# Patient Record
Sex: MALE | Race: OTHER | NOT HISPANIC OR LATINO | URBAN - METROPOLITAN AREA
[De-identification: names, ages, dates, MRNs, and addresses within clinical notes are randomized per-mention and may not be internally consistent; named-entity substitution may affect disease eponyms.]

---

## 2022-04-24 ENCOUNTER — HOSPITAL ENCOUNTER (EMERGENCY)
Facility: HOSPITAL | Age: 37
Discharge: HOME | End: 2022-04-24
Attending: EMERGENCY MEDICINE

## 2022-04-24 VITALS
RESPIRATION RATE: 18 BRPM | OXYGEN SATURATION: 98 % | BODY MASS INDEX: 31.13 KG/M2 | TEMPERATURE: 99 F | WEIGHT: 198.38 LBS | SYSTOLIC BLOOD PRESSURE: 141 MMHG | DIASTOLIC BLOOD PRESSURE: 85 MMHG | HEART RATE: 97 BPM | HEIGHT: 67 IN

## 2022-04-24 DIAGNOSIS — S92.311A CLOSED DISPLACED FRACTURE OF FIRST METATARSAL BONE OF RIGHT FOOT, INITIAL ENCOUNTER: Primary | ICD-10-CM

## 2022-04-24 DIAGNOSIS — S99.929A FOOT INJURY: ICD-10-CM

## 2022-04-24 PROCEDURE — 99284 EMERGENCY DEPT VISIT MOD MDM: CPT | Mod: 25,ER

## 2022-04-24 PROCEDURE — 63600175 PHARM REV CODE 636 W HCPCS: Mod: ER | Performed by: EMERGENCY MEDICINE

## 2022-04-24 PROCEDURE — 96372 THER/PROPH/DIAG INJ SC/IM: CPT | Performed by: EMERGENCY MEDICINE

## 2022-04-24 RX ORDER — HYDROCODONE BITARTRATE AND ACETAMINOPHEN 5; 325 MG/1; MG/1
1 TABLET ORAL EVERY 6 HOURS PRN
Qty: 12 TABLET | Refills: 0 | OUTPATIENT
Start: 2022-04-24 | End: 2023-08-09

## 2022-04-24 RX ORDER — KETOROLAC TROMETHAMINE 30 MG/ML
30 INJECTION, SOLUTION INTRAMUSCULAR; INTRAVENOUS
Status: COMPLETED | OUTPATIENT
Start: 2022-04-24 | End: 2022-04-24

## 2022-04-24 RX ORDER — IBUPROFEN 400 MG/1
400 TABLET ORAL EVERY 6 HOURS PRN
Qty: 20 TABLET | Refills: 0 | OUTPATIENT
Start: 2022-04-24 | End: 2023-08-09

## 2022-04-24 RX ADMIN — KETOROLAC TROMETHAMINE 30 MG: 30 INJECTION, SOLUTION INTRAMUSCULAR at 09:04

## 2022-04-24 NOTE — Clinical Note
"Sang Smith" Dyer was seen and treated in our emergency department on 4/24/2022.  He may return to work after being cleared by follow-up physician .       If you have any questions or concerns, please don't hesitate to call.      Nicko BERMEO"

## 2022-04-25 NOTE — ED PROVIDER NOTES
Encounter Date: 4/24/2022    SCRIBE #1 NOTE: I, Marcus Zarco, am scribing for, and in the presence of,  Sourav Teixeira MD. I have scribed the following portions of the note - Other sections scribed: HPI, ROS, PE.       History     Chief Complaint   Patient presents with    Foot Injury     Right mid foot pain after kicking a truck on Saturday.  Hurts to touch or bear weight. Swelling noted.      Sang Dyer 37 y.o. male, with no known pertinent PMHx, presents to the ED with right mid foot pain and swelling that started yesterday. Patient states he kicked a truck yesterday and that his right foot pain is exacerbated with weightbearing. Patient denies any other associated symptoms.     The history is provided by the patient and a relative. No  was used.     Review of patient's allergies indicates:  No Known Allergies  No past medical history on file.  No past surgical history on file.  No family history on file.     Review of Systems   Constitutional: Negative.    HENT: Negative.    Eyes: Negative.    Respiratory: Negative.    Cardiovascular: Negative.    Gastrointestinal: Negative.    Endocrine: Negative.    Genitourinary: Negative.    Musculoskeletal: Positive for arthralgias and joint swelling.   Skin: Negative.    Allergic/Immunologic: Negative.    Neurological: Negative.    Hematological: Negative.    Psychiatric/Behavioral: Negative.        Physical Exam     Initial Vitals [04/24/22 1935]   BP Pulse Resp Temp SpO2   (!) 146/83 101 18 99.8 °F (37.7 °C) 95 %      MAP       --         Physical Exam    Nursing note and vitals reviewed.  Constitutional: He appears well-developed and well-nourished. He is not diaphoretic. No distress.   HENT:   Head: Normocephalic and atraumatic.   Eyes: Pupils are equal, round, and reactive to light. Right eye exhibits no discharge. Left eye exhibits no discharge.   Neck: No tracheal deviation present.   Normal range of motion.  Cardiovascular: Normal rate  and regular rhythm.   Pulmonary/Chest: No stridor. No respiratory distress.   Musculoskeletal:         General: Tenderness (Noted tenderness and edema to right medial portion of midfoot extending just distally to his right ankle, 2+ DP pulses noted, motor and sensation intact to light touch throughout right foot, no abrasion or laceration noted.) and edema present.      Cervical back: Normal range of motion.     Neurological: He is alert and oriented to person, place, and time.   Skin: Skin is warm and dry.         ED Course   Procedures  Labs Reviewed - No data to display       Imaging Results          X-Ray Foot Complete Right (Final result)  Result time 04/24/22 20:32:37    Final result by Antonio Bagley MD (04/24/22 20:32:37)                 Impression:      Acute 1st metatarsal fracture.      Electronically signed by: Antonio Bagley MD  Date:    04/24/2022  Time:    20:32             Narrative:    EXAMINATION:  XR FOOT COMPLETE 3 VIEW RIGHT    CLINICAL HISTORY:  . Unspecified injury of unspecified foot, initial encounter    TECHNIQUE:  AP, lateral, and oblique views of the right foot were performed.    COMPARISON:  None    FINDINGS:  Acute displaced fracture is seen at the base of the 1st metatarsal.  There is intra-articular extension to the tarsometatarsal joint.  No additional acute displaced fractures or dislocation seen.  Lisfranc articulation appears congruent.  No radiopaque retained foreign body seen.                                 Medications   ketorolac injection 30 mg (30 mg Intramuscular Given 4/24/22 2100)     Medical Decision Making:   Independently Interpreted Test(s):   I have ordered and independently interpreted X-rays - see prior notes.  Clinical Tests:   Radiological Study: Ordered and Reviewed    MDM:    37-year-old male with past medical history as noted above presenting after right foot injury.  Physical exam as noted above, ED workup notable for acute 1st metatarsal fracture with  some intra-articular extension to the tarsometatarsal joint.  Patient placed in a postoperative shoe, Toradol given for further symptomatic care, crutches given, patient advised on nonweightbearing status and need for follow-up with Orthopedics/Podiatry for further treatment and evaluation.  Compartments appearing soft, patient advised on further rice therapies at bedside including ongoing pain management.  Discussed diagnosis and further treatment with patient and family at bedside, including f/u.  Return precautions given and all questions answered.  Patient and family in understanding of plan.  Pt discharged to home improved and stable.          Scribe Attestation:   Scribe #1: I performed the above scribed service and the documentation accurately describes the services I performed. I attest to the accuracy of the note.               I, Sourav Teixeira M.D., personally performed the services described in the documentation. All medical record entries made by the scribe were at my direction and in my presence. I have reviewed the chart and agree that the record reflects my personal performance and is accurate and complete.    Clinical Impression:   Final diagnoses:  [S99.929A] Foot injury  [S92.311A] Closed displaced fracture of first metatarsal bone of right foot, initial encounter (Primary)          ED Disposition Condition    Discharge Stable        ED Prescriptions     Medication Sig Dispense Start Date End Date Auth. Provider    HYDROcodone-acetaminophen (NORCO) 5-325 mg per tablet Take 1 tablet by mouth every 6 (six) hours as needed for Pain. 12 tablet 4/24/2022  Sourav Teixeira MD    ibuprofen (ADVIL,MOTRIN) 400 MG tablet Take 1 tablet (400 mg total) by mouth every 6 (six) hours as needed for Other. 20 tablet 4/24/2022  Sourav Teixeira MD        Follow-up Information     Follow up With Specialties Details Why Contact Info    [facility] ED Emergency Medicine Go to  If symptoms worsen  [address] [facility] [address] [phone]    [facility]  PODIATRY Podiatry   [address] [facility] [address]  [phone]    [facility]  ORTHOPEDICS Orthopedics   [address] [facility] [address]  [phone]    Jennifer Kong MD Orthopedic Surgery   [address] [facility] [address]  [phone]             Sourav Teixeira MD  04/25/22 0148

## 2022-04-25 NOTE — DISCHARGE INSTRUCTIONS
[facility] - Orthopedics Clinic:  If you would like to follow up with the [facility] Orthopedic Clinic for further care of your injury, please call the [facility] Scheduling Department at [phone] during business hours. Please let the  know you need a follow-up appointment for your injury with Orthopedics, and you will be scheduled in the Orthopedic Clinic. Please bring your Discharge Papers with you to the clinic appointment.

## 2023-08-09 ENCOUNTER — HOSPITAL ENCOUNTER (EMERGENCY)
Facility: HOSPITAL | Age: 38
Discharge: HOME | End: 2023-08-09
Attending: EMERGENCY MEDICINE

## 2023-08-09 VITALS
TEMPERATURE: 98 F | DIASTOLIC BLOOD PRESSURE: 95 MMHG | BODY MASS INDEX: 31.9 KG/M2 | WEIGHT: 203.69 LBS | OXYGEN SATURATION: 97 % | SYSTOLIC BLOOD PRESSURE: 155 MMHG | HEART RATE: 109 BPM | RESPIRATION RATE: 18 BRPM

## 2023-08-09 DIAGNOSIS — M54.31 RIGHT SIDED SCIATICA: Primary | ICD-10-CM

## 2023-08-09 DIAGNOSIS — M47.816 LUMBAR FACET ARTHROPATHY: ICD-10-CM

## 2023-08-09 PROCEDURE — 99284 EMERGENCY DEPT VISIT MOD MDM: CPT | Mod: ER

## 2023-08-09 PROCEDURE — 63600175 PHARM REV CODE 636 W HCPCS: Mod: ER | Performed by: NURSE PRACTITIONER

## 2023-08-09 PROCEDURE — 96372 THER/PROPH/DIAG INJ SC/IM: CPT | Performed by: NURSE PRACTITIONER

## 2023-08-09 PROCEDURE — 25000003 PHARM REV CODE 250: Mod: ER | Performed by: NURSE PRACTITIONER

## 2023-08-09 RX ORDER — ORPHENADRINE CITRATE 100 MG/1
100 TABLET, EXTENDED RELEASE ORAL 2 TIMES DAILY PRN
Qty: 20 TABLET | Refills: 0 | Status: SHIPPED | OUTPATIENT
Start: 2023-08-09 | End: 2023-08-19

## 2023-08-09 RX ORDER — DEXAMETHASONE SODIUM PHOSPHATE 4 MG/ML
8 INJECTION, SOLUTION INTRA-ARTICULAR; INTRALESIONAL; INTRAMUSCULAR; INTRAVENOUS; SOFT TISSUE
Status: COMPLETED | OUTPATIENT
Start: 2023-08-09 | End: 2023-08-09

## 2023-08-09 RX ORDER — IBUPROFEN 600 MG/1
600 TABLET ORAL EVERY 6 HOURS PRN
Qty: 20 TABLET | Refills: 0 | Status: SHIPPED | OUTPATIENT
Start: 2023-08-09

## 2023-08-09 RX ORDER — LIDOCAINE 50 MG/G
1 PATCH TOPICAL DAILY
Qty: 15 PATCH | Refills: 0 | Status: SHIPPED | OUTPATIENT
Start: 2023-08-09

## 2023-08-09 RX ORDER — ORPHENADRINE CITRATE 100 MG/1
100 TABLET, EXTENDED RELEASE ORAL
Status: COMPLETED | OUTPATIENT
Start: 2023-08-09 | End: 2023-08-09

## 2023-08-09 RX ADMIN — ORPHENADRINE CITRATE 100 MG: 100 TABLET, EXTENDED RELEASE ORAL at 03:08

## 2023-08-09 RX ADMIN — DEXAMETHASONE SODIUM PHOSPHATE 8 MG: 4 INJECTION INTRA-ARTICULAR; INTRALESIONAL; INTRAMUSCULAR; INTRAVENOUS; SOFT TISSUE at 03:08

## 2023-08-09 NOTE — Clinical Note
"Sang Stewartjuliana Dyer was seen and treated in our emergency department on 8/9/2023.  He may return to work on 08/14/2023.       If you have any questions or concerns, please don't hesitate to call.      Marylin BERMEO    "

## 2023-08-09 NOTE — DISCHARGE INSTRUCTIONS
Le brewer recetado NORFLEX para el dolor. No tome elis medicamento mientras trabaja, sebastián alcohol, nada o mientras conduce u opera maquinaria pesada. Elis medicamento puede causar somnolencia, afectar el juicio y reducir las capacidades físicas.    Le brewer recetado ibuprofeno para el dolor. Elis es un medicamento antiinflamatorio no esteroideo (PETER). No tome ningún PETER adicional mientras esté tomando elis medicamento, incluidos (Advil, Aleve, Motrin, Ibuprofen, Mobic\meloxicam, Naprosyn, etc.). Deje de miranda elis medicamento si experimenta: debilidad, picazón, piel u ojos amarillos, nina en las articulaciones, vómitos con luis manuel, luis manuel o heces negras, aumento de peso inusual o hinchazón en los brazos, las piernas, las wiley o los pies.    Jason por venir a nuestro Departamento de Emergencias hoy. Es importante recordar que algunos problemas o condiciones médicas son difíciles de diagnosticar y es posible que no se encuentren katie lozoya visita al Departamento de Emergencias.    Asegúrese de hacer un seguimiento con lozoya médico de atención primaria y revisar con ellos todos los laboratorios/imágenes/pruebas que se realizaron katie lozoya visita a la dixon de emergencias. Algunos laboratorios/pruebas pueden estar fuera del rango normal y requieren un seguimiento que no sea de emergencia y isela mayor investigación para ayudar a diagnosticar/excluir/prevenir complicaciones u otras afecciones médicas potencialmente graves que no se abordaron katie lozoya visita a la dixon de emergencias.    Si no tiene un médico de atención primaria, puede comunicarse con el que figura en lozoya documentación de kareem o también puede llamar al mostrador de citas de la [facility] al [phone] para programar isela seymour y establecer atención con jeremy. Es importante para lozoya jose que tenga un médico de atención primaria.    Martell todos los medicamentos según las indicaciones. Todos los medicamentos pueden tener efectos secundarios y es imposible  predecir qué medicamentos pueden causarle efectos secundarios o qué efectos secundarios (si los hay) le darán. Si siente que está teniendo un efecto negativo o secundario, efecto de cualquier medicamento, debe dejar de tomarlos inmediatamente y buscar atención médica. Si siente que está teniendo isela reacción potencialmente mortal, llame al 911.    Regrese a la dixon de emergencias si tiene preguntas/inquietudes, síntomas nuevos/preocupantes, empeoramiento o falta de mejora.    No conduzca, nade, suba a [place], se bañe, opere maquinaria pesada, mark alcohol o tome medicamentos potencialmente sedantes, firme ningún documento legal o tome decisiones importantes katie 24 horas si ha recibido algún medicamento para el dolor, sedantes o alteradores del estado de ánimo. medicamentos katie lozoya visita a la dixon de emergencias o dentro de las 24 horas de haberlos tomado si se los brewer recetado.    Puede encontrar recursos adicionales para dentistas, audífonos, equipos médicos duraderos, farmacias de bajo costo y otros recursos en [email or web address]

## 2023-08-09 NOTE — ED PROVIDER NOTES
Encounter Date: 8/9/2023    SCRIBE #1 NOTE: I, Madelien Hemphill, am scribing for, and in the presence of,  Jade Nelson NP. I have scribed the following portions of the note - Other sections scribed: HPI; ROS.       History     Chief Complaint   Patient presents with    Back Pain     Pt presents to the ED with R sided back pain , onset approx 1 month ago and worsening.     Sang Dyer is a 38 y.o. male with no known medical Hx who presents to the ED for chief complaint of right lower back pain onset 1 month ago. Patient reports the pain began after he fell, but states he landed on his left side not his right. He states the pain radiates down the right leg. Patient notes at times he can't walk because of the pain. He states his leg gives out and he gets muscle cramps and tingling in his right leg. Patient did not attempt treatment at home. He denies associated incontinence. No further complaints at this time. Patient does not have any medical allergies. Patient occasionally uses EtOH and cocaine.         The history is provided by the patient. A  was used ([email or web address]).     Review of patient's allergies indicates:  No Known Allergies  No past medical history on file.  No past surgical history on file.  No family history on file.     Review of Systems   Constitutional:  Negative for activity change, appetite change, chills and fever.   HENT:  Negative for congestion, rhinorrhea, sneezing and sore throat.    Respiratory:  Negative for cough, chest tightness, shortness of breath and wheezing.    Cardiovascular:  Negative for chest pain and palpitations.   Gastrointestinal:  Negative for abdominal pain, diarrhea, nausea and vomiting.        Negative for bowel incontinence.    Genitourinary:         Negative for bladder incontinence.    Musculoskeletal:  Positive for back pain.   Skin:  Negative for rash.   Neurological:  Negative for dizziness, light-headedness and headaches.        Positive for  tingling.    All other systems reviewed and are negative.      Physical Exam     Initial Vitals [08/09/23 1418]   BP Pulse Resp Temp SpO2   (!) 155/95 109 18 98.4 °F (36.9 °C) 97 %      MAP       --         Physical Exam    Vitals reviewed.  Constitutional: He appears well-developed and well-nourished. He is not diaphoretic.  Non-toxic appearance. He does not have a sickly appearance. He does not appear ill. No distress.   HENT:   Head: Normocephalic and atraumatic.   Right Ear: External ear normal.   Left Ear: External ear normal.   Neck:   Normal range of motion.  Cardiovascular:  Normal rate, regular rhythm, normal heart sounds and intact distal pulses.           Pulmonary/Chest: No respiratory distress.   Musculoskeletal:      Cervical back: Normal and normal range of motion.      Thoracic back: Normal.      Lumbar back: Tenderness present. Positive right straight leg raise test.      Comments: Tenderness with palpation of the right paraspinal lumbar musculature.  No midline tenderness.  5/5 strength of the bilateral upper and lower extremities with sensation intact.  Positive straight leg raise on the right.  Ambulatory with normal gait.     Neurological: He is alert and oriented to person, place, and time. GCS eye subscore is 4. GCS verbal subscore is 5. GCS motor subscore is 6.   Skin: Skin is warm, dry and intact. No rash noted. No erythema.   Psychiatric: He has a normal mood and affect. Thought content normal.         ED Course   Procedures  Labs Reviewed - No data to display       Imaging Results              X-Ray Lumbar Spine Ap And Lateral (Final result)  Result time 08/09/23 15:21:36      Final result by Zackary Soto MD (08/09/23 15:21:36)                   Impression:      1. No acute displaced fracture or dislocation of the lumbar spine.      Electronically signed by: aZckary Soto MD  Date:    08/09/2023  Time:    15:21               Narrative:    EXAMINATION:  XR LUMBAR SPINE AP AND  LATERAL    CLINICAL HISTORY:  low back pain;    TECHNIQUE:  AP, lateral and spot images were performed of the lumbar spine.    COMPARISON:  None    FINDINGS:  Three views lumbar spine.    Lateral imaging demonstrates adequate alignment of the lumbar spine without significant vertebral body height loss.  There is disc space height loss involving L5-S1.  There is lower lumbar facet arthropathy.  The sacral segments are aligned.  AP spinal alignment is remarkable for mild levo scoliotic curvature.  The bilateral sacroiliac joints are intact.                                       Medications   dexAMETHasone injection 8 mg (8 mg Intramuscular Given 8/9/23 1510)   orphenadrine 12 hr tablet 100 mg (100 mg Oral Given 8/9/23 1510)     Medical Decision Making:   History:   Old Medical Records: I decided to obtain old medical records.  Old Records Summarized: records from clinic visits.       <> Summary of Records: External documents reviewed.   Independently Interpreted Test(s):   I have ordered and independently interpreted X-rays - see prior notes.  Clinical Tests:   Radiological Study: Ordered and Reviewed  ED Management:    This is an evaluation of a 38 y.o. male that presents to the Emergency Department for back pain.  Denies fever, rash, night sweats, weight loss, dysuria, bowel/bladder incontinence, or IV\SQ drug use. The patient is a non-toxic, afebrile, and well appearing male. On physical exam, there is tenderness with palpation of the right paraspinal lumbar musculature. Thereis no abdominal pain to palpation, CVA tenderness, saddle anesthesia. Reflexes and sensation are symmetric bilaterally.  DP pulses are symmetrical.     Vital signs reassuring. RESULTS:   X-ray lumbar spine negative for acute displaced fracture or dislocation.  Disc base height loss noted.  Lower lumbar facet arthropathy.    Given the above findings, I do not think the patient has acute vertebral fracture, subluxation, dislocation, epidural  abscess, cauda equina, shingles, UTI.    The diagnosis, treatment plan, instructions for follow-up and reevaluation with PCP as well as ED return precautions were discussed and understanding was verbalized. All questions or concerns have been addressed.           Scribe Attestation:   Scribe #1: I performed the above scribed service and the documentation accurately describes the services I performed. I attest to the accuracy of the note.        ED Course as of 08/09/23 1619   Wed Aug 09, 2023   1527 X-Ray Lumbar Spine Ap And Lateral  No acute displaced fracture or dislocation of the lumbar spine. [MT]      ED Course User Index  [MT] Jade Nelson NP          Scribe attestation: I, NURYS Nelson, personally performed the services described in this documentation.  All medical record entries made by the scribe were at my direction and in my presence.  I have reviewed the chart and agree that the record reflects my personal performance and is accurate and complete.         Clinical Impression:   Final diagnoses:  [M47.816] Lumbar facet arthropathy  [M54.31] Right sided sciatica (Primary)        ED Disposition Condition    Discharge Stable          ED Prescriptions       Medication Sig Dispense Start Date End Date Auth. Provider    ibuprofen (ADVIL,MOTRIN) 600 MG tablet Take 1 tablet (600 mg total) by mouth every 6 (six) hours as needed for Pain. 20 tablet 8/9/2023 -- Jade Nelson NP    orphenadrine (NORFLEX) 100 mg tablet Take 1 tablet (100 mg total) by mouth 2 (two) times daily as needed for Muscle spasms or Pain. 20 tablet 8/9/2023 8/19/2023 Jade Nelson NP    LIDOcaine (LIDODERM) 5 % Place 1 patch onto the skin once daily. Remove & Discard patch within 12 hours or as directed by MD 15 patch 8/9/2023 -- Jade Nelson NP          Follow-up Information       Follow up With Specialties Details Why Contact [facility] -  Schedule an appointment as soon as possible for a visit in 1  day For follow-up if you do not have a primary care doctor [address]  [place] [address]  [phone]      [facility] Surgical Oncology, Orthopedic Surgery, Genetics, Physical Medicine and Rehabilitation, Occupational Therapy, Radiology Schedule an appointment as soon as possible for a visit  For follow-up---primary care/internal medicine [address] [facility] [address]  [phone]      [facility] ED Emergency Medicine Go to  If symptoms worsen [address]  [facility] [address] [phone]             Jade Nelson, NP  08/09/23 0191